# Patient Record
Sex: FEMALE | URBAN - NONMETROPOLITAN AREA
[De-identification: names, ages, dates, MRNs, and addresses within clinical notes are randomized per-mention and may not be internally consistent; named-entity substitution may affect disease eponyms.]

---

## 2021-03-09 ENCOUNTER — OFFICE VISIT (OUTPATIENT)
Dept: URBAN - NONMETROPOLITAN AREA CLINIC 18 | Facility: CLINIC | Age: 56
End: 2021-03-09
Payer: COMMERCIAL

## 2021-03-09 DIAGNOSIS — E11.9 TYPE 2 DIABETES MELLITUS W/O COMPLICATION: Chronic | ICD-10-CM

## 2021-03-09 DIAGNOSIS — H52.13 MYOPIA, BILATERAL: Primary | Chronic | ICD-10-CM

## 2021-03-09 DIAGNOSIS — E05.01: ICD-10-CM

## 2021-03-09 DIAGNOSIS — H25.13 AGE-RELATED NUCLEAR CATARACT, BILATERAL: Chronic | ICD-10-CM

## 2021-03-09 DIAGNOSIS — H40.053 OCULAR HYPERTENSION, BILATERAL: Chronic | ICD-10-CM

## 2021-03-09 PROCEDURE — 92004 COMPRE OPH EXAM NEW PT 1/>: CPT | Performed by: OPTOMETRIST

## 2021-03-09 ASSESSMENT — VISUAL ACUITY
OS: 20/20
OD: 20/25

## 2021-03-09 ASSESSMENT — INTRAOCULAR PRESSURE
OS: 14
OD: 14
OD: 12

## 2021-03-09 NOTE — IMPRESSION/PLAN
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 Plan: Graves diseae. See work up above for Jamir Dorman. Will monitor optic nerve status. Prior thryoid treatment. Consider referral back to Dr. Aubree Jacques if any new symptoms experienced. Cornea scar reviewed based on condition. Longstanding condition per patient.

## 2021-03-09 NOTE — IMPRESSION/PLAN
Impression: Ocular hypertension, bilateral: H40.053. Plan: Discussed status of examination with patient. Continue Combigan QD OU as previously instructed. Recommend glaucoma workup with pachymetry, gonioscopy, VF 24-2, and ON OCT Pretreatment IOP 28, per patient. Patient understands risk associated with condition and need for monitoring.

## 2021-03-09 NOTE — IMPRESSION/PLAN
Impression: Type 2 diabetes mellitus w/o complication: R24.9. Plan: Reviewed with patient that no retinal vascular changes are occurring from diabetes. Patient to continue care with current medication provider for diabetes management. Importance of retinal exams reviewed. Will continue to observe with dilated examination in 12 months.

## 2021-04-16 ENCOUNTER — TESTING ONLY (OUTPATIENT)
Dept: URBAN - NONMETROPOLITAN AREA CLINIC 18 | Facility: CLINIC | Age: 56
End: 2021-04-16
Payer: COMMERCIAL

## 2021-04-16 PROCEDURE — 92083 EXTENDED VISUAL FIELD XM: CPT | Performed by: OPTOMETRIST

## 2021-04-21 ENCOUNTER — OFFICE VISIT (OUTPATIENT)
Dept: URBAN - NONMETROPOLITAN AREA CLINIC 18 | Facility: CLINIC | Age: 56
End: 2021-04-21
Payer: COMMERCIAL

## 2021-04-21 DIAGNOSIS — H04.123 DRY EYE SYNDROME OF BILATERAL LACRIMAL GLANDS: ICD-10-CM

## 2021-04-21 PROCEDURE — 92083 EXTENDED VISUAL FIELD XM: CPT | Performed by: OPTOMETRIST

## 2021-04-21 PROCEDURE — 99214 OFFICE O/P EST MOD 30 MIN: CPT | Performed by: OPTOMETRIST

## 2021-04-21 PROCEDURE — 92133 CPTRZD OPH DX IMG PST SGM ON: CPT | Performed by: OPTOMETRIST

## 2021-04-21 PROCEDURE — 76514 ECHO EXAM OF EYE THICKNESS: CPT | Performed by: OPTOMETRIST

## 2021-04-21 ASSESSMENT — INTRAOCULAR PRESSURE
OS: 16
OD: 14

## 2021-04-21 NOTE — IMPRESSION/PLAN
Impression: Dry eye syndrome of bilateral lacrimal glands: H04.123. Plan: Genteal QHS and PF ATs during the day. Sleeping mask discussed to help reduce symptoms.

## 2021-04-21 NOTE — IMPRESSION/PLAN
Impression: Ocular hypertension, bilateral: H40.053. Plan: Discussed status of examination with patient. Continue Combigan QD OU as previously instructed. Pretreatment IOP 28, per patient. OCT normal RNFL Average. Pachy thin (488/496), VF OD: nasal loss, OS paracentral loss. repeat needed. Patient understands risk associated with condition and need for monitoring.

## 2021-04-21 NOTE — IMPRESSION/PLAN
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 Plan: Continue care with PCP. Dry Eye status discussed. VF with nasal loss OD, mild paracentral loss OS. Repeat needed. ON no signs of compression with observation and OCT ON. Referral to Dr. Luis Atkins offered, patient will notify clinic if desired.

## 2021-07-22 ENCOUNTER — TESTING ONLY (OUTPATIENT)
Dept: URBAN - NONMETROPOLITAN AREA CLINIC 18 | Facility: CLINIC | Age: 56
End: 2021-07-22
Payer: COMMERCIAL

## 2021-07-22 PROCEDURE — 92083 EXTENDED VISUAL FIELD XM: CPT | Performed by: OPTOMETRIST

## 2021-07-28 ENCOUNTER — OFFICE VISIT (OUTPATIENT)
Dept: URBAN - NONMETROPOLITAN AREA CLINIC 18 | Facility: CLINIC | Age: 56
End: 2021-07-28
Payer: COMMERCIAL

## 2021-07-28 PROCEDURE — 99213 OFFICE O/P EST LOW 20 MIN: CPT | Performed by: OPTOMETRIST

## 2021-07-28 ASSESSMENT — INTRAOCULAR PRESSURE
OS: 16
OD: 15

## 2021-07-28 NOTE — IMPRESSION/PLAN
Impression: Dry eye syndrome of bilateral lacrimal glands: H04.123. Plan: Discussed condition with patient in detail. Recommend treatment with Preservative Free Artificial tears QID. RTC if symptoms continue. Genteal gel QHS.

## 2021-07-28 NOTE — IMPRESSION/PLAN
Impression: Ocular hypertension, bilateral: H40.053. Plan: Discussed status of examination with patient. Continue Combigan QD OU as previously instructed. Pretreatment IOP 28, per patient. OCT normal RNFL Average. Pachy thin (488/496), VF OD/OS: no pattern loss. Patient understands risk associated with condition and need for monitoring. IOP check in 4 months.

## 2021-11-24 ENCOUNTER — OFFICE VISIT (OUTPATIENT)
Dept: URBAN - NONMETROPOLITAN AREA CLINIC 18 | Facility: CLINIC | Age: 56
End: 2021-11-24
Payer: COMMERCIAL

## 2021-11-24 PROCEDURE — 99213 OFFICE O/P EST LOW 20 MIN: CPT | Performed by: OPTOMETRIST

## 2021-11-24 ASSESSMENT — INTRAOCULAR PRESSURE
OS: 14
OD: 14
OD: 12
OS: 12

## 2021-11-24 NOTE — IMPRESSION/PLAN
Impression: Ocular hypertension, bilateral: H40.053. Plan: Discussed status of examination with patient. Continue Combigan BID OU. Pretreatment IOP 28, per patient. Target 14. OCT normal RNFL Average. Pachy thin (488/496), VF OD/OS: no pattern loss. Patient understands risk associated with condition and need for monitoring. Dilation (DM exam) and OCT ON in 4 months.

## 2021-11-24 NOTE — IMPRESSION/PLAN
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 Plan: Continue care with PCP. Dry Eye status discussed. Repeat needed. ON no signs of compression with observation and OCT ON.

## 2022-03-30 ENCOUNTER — OFFICE VISIT (OUTPATIENT)
Dept: URBAN - NONMETROPOLITAN AREA CLINIC 18 | Facility: CLINIC | Age: 57
End: 2022-03-30
Payer: COMMERCIAL

## 2022-03-30 DIAGNOSIS — E11.3393 TYPE 2 DIAB W MODERATE NONPRLF DIAB RTNOP W/O MACULAR EDEMA, BILATERAL: Primary | ICD-10-CM

## 2022-03-30 DIAGNOSIS — H17.89 OTHER CORNEAL SCARS AND OPACITIES: ICD-10-CM

## 2022-03-30 PROCEDURE — 92133 CPTRZD OPH DX IMG PST SGM ON: CPT | Performed by: OPTOMETRIST

## 2022-03-30 PROCEDURE — 99214 OFFICE O/P EST MOD 30 MIN: CPT | Performed by: OPTOMETRIST

## 2022-03-30 PROCEDURE — 92250 FUNDUS PHOTOGRAPHY W/I&R: CPT | Performed by: OPTOMETRIST

## 2022-03-30 ASSESSMENT — INTRAOCULAR PRESSURE
OS: 13
OD: 13

## 2022-03-30 NOTE — IMPRESSION/PLAN
Impression: Ocular hypertension, bilateral: H40.053. Plan: Discussed status of examination with patient. Continue Combigan BID OU. Pretreatment IOP 28, per patient. Target 14. OCT ON today, stable RNFL Average. Pachy thin (488/496), VF OD/OS: no pattern loss. Patient understands risk associated with condition and need for monitoring.

## 2022-03-30 NOTE — IMPRESSION/PLAN
Impression: Type 2 diab w moderate nonprlf diab rtnop w/o macular edema, bilateral: S70.0120. Plan: Reviewed with patient that mild retinal vascular changes are occurring from diabetes. Baseline fundus photos today. These changes do not require treatment at this time, however, the patient is aware that the condition can progress and may require additional treatment in the future. Systemic monitoring with PCP/endocrinologist is recommended to decrease risk of damage from diabetes. Will continue to observe with dilated examination in 8 months.

## 2022-03-30 NOTE — IMPRESSION/PLAN
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 Plan: Continue care with PCP. Dry Eye status discussed. No signs of compression with observation and OCT ON.

## 2022-12-23 ENCOUNTER — TESTING ONLY (OUTPATIENT)
Dept: URBAN - NONMETROPOLITAN AREA CLINIC 18 | Facility: CLINIC | Age: 57
End: 2022-12-23
Payer: COMMERCIAL

## 2022-12-23 DIAGNOSIS — H40.053 OCULAR HYPERTENSION, BILATERAL: Primary | ICD-10-CM

## 2022-12-23 DIAGNOSIS — E11.3393 TYPE 2 DIABETES MELLITUS WITH MODERATE NONPROLIFERATIVE DIABETIC RETINOPATHY WITHOUT MACULAR EDEMA, BILATERAL: ICD-10-CM

## 2022-12-23 PROCEDURE — 92134 CPTRZ OPH DX IMG PST SGM RTA: CPT | Performed by: OPTOMETRIST

## 2022-12-23 PROCEDURE — 92083 EXTENDED VISUAL FIELD XM: CPT | Performed by: OPTOMETRIST

## 2023-01-03 ENCOUNTER — OFFICE VISIT (OUTPATIENT)
Dept: URBAN - NONMETROPOLITAN AREA CLINIC 18 | Facility: CLINIC | Age: 58
End: 2023-01-03
Payer: COMMERCIAL

## 2023-01-03 DIAGNOSIS — E11.3393 TYPE 2 DIAB W MODERATE NONPRLF DIAB RTNOP W/O MACULAR EDEMA, BILATERAL: ICD-10-CM

## 2023-01-03 DIAGNOSIS — H40.053 OCULAR HYPERTENSION, BILATERAL: Primary | ICD-10-CM

## 2023-01-03 DIAGNOSIS — E05.01: ICD-10-CM

## 2023-01-03 DIAGNOSIS — H52.13 MYOPIA, BILATERAL: ICD-10-CM

## 2023-01-03 PROCEDURE — 99213 OFFICE O/P EST LOW 20 MIN: CPT | Performed by: OPTOMETRIST

## 2023-01-03 ASSESSMENT — INTRAOCULAR PRESSURE
OS: 15
OD: 13

## 2023-01-03 ASSESSMENT — VISUAL ACUITY
OS: 20/20
OD: 20/20

## 2023-01-03 NOTE — IMPRESSION/PLAN
Impression: Type 2 diab w moderate nonprlf diab rtnop w/o macular edema, bilateral: W43.5612. Plan: Dilation deferred today. Macula OCT flat, no edema. Monitor with dilation in 2-3 months. Limited retina views today. RTC if any vision changes experienced.

## 2023-01-03 NOTE — IMPRESSION/PLAN
Impression: Ocular hypertension, bilateral: H40.053. Plan: Discussed status of examination with patient. Continue Combigan BID OU, tolerating well. Pretreatment IOP 28, per patient. Target 14.  (borderline) OCT ON today, stable RNFL Average. Pachy thin (488/496), VF OD/OS: no pattern loss (stable) Patient understands risk associated with condition and need for monitoring.

## 2023-04-11 ENCOUNTER — OFFICE VISIT (OUTPATIENT)
Dept: URBAN - NONMETROPOLITAN AREA CLINIC 18 | Facility: CLINIC | Age: 58
End: 2023-04-11
Payer: COMMERCIAL

## 2023-04-11 DIAGNOSIS — H17.89 OTHER CORNEAL SCARS AND OPACITIES: ICD-10-CM

## 2023-04-11 DIAGNOSIS — E11.3393 TYPE 2 DIAB W MODERATE NONPRLF DIAB RTNOP W/O MACULAR EDEMA, BILATERAL: Primary | ICD-10-CM

## 2023-04-11 DIAGNOSIS — H04.123 DRY EYE SYNDROME OF BILATERAL LACRIMAL GLANDS: ICD-10-CM

## 2023-04-11 DIAGNOSIS — H40.053 OCULAR HYPERTENSION, BILATERAL: ICD-10-CM

## 2023-04-11 DIAGNOSIS — E05.01: ICD-10-CM

## 2023-04-11 DIAGNOSIS — H25.13 AGE-RELATED NUCLEAR CATARACT, BILATERAL: ICD-10-CM

## 2023-04-11 PROCEDURE — 99213 OFFICE O/P EST LOW 20 MIN: CPT | Performed by: OPTOMETRIST

## 2023-04-11 ASSESSMENT — INTRAOCULAR PRESSURE
OS: 14
OD: 14

## 2023-04-11 NOTE — IMPRESSION/PLAN
Impression: Type 2 diab w moderate nonprlf diab rtnop w/o macular edema, bilateral: W90.5211. Plan: Reviewed with patient that mild retinal vascular changes are occurring from diabetes. Baseline fundus photos today. These changes do not require treatment at this time, however, the patient is aware that the condition can progress and may require additional treatment in the future. Systemic monitoring with PCP/endocrinologist is recommended to decrease risk of damage from diabetes. Will continue to observe with dilated examination in 9 months.

## 2023-04-11 NOTE — IMPRESSION/PLAN
Impression: Ocular hypertension, bilateral: H40.053. Plan: Discussed status of examination with patient. Continue Combigan BID OU, tolerating well. Pretreatment IOP 28, per patient. Target 14, met today. OCT ON , stable RNFL Average. Pachy thin (488/496) VF OD/OS: no pattern loss (stable) Patient understands risk associated with condition and need for monitoring.

## 2023-04-11 NOTE — IMPRESSION/PLAN
Impression: Dry eye syndrome of bilateral lacrimal glands: H04.123. Plan: Discussed condition with patient in detail. Continue treatment with Preservative Free Artificial tears QID. RTC if symptoms continue.

## 2023-08-15 ENCOUNTER — OFFICE VISIT (OUTPATIENT)
Dept: URBAN - NONMETROPOLITAN AREA CLINIC 18 | Facility: CLINIC | Age: 58
End: 2023-08-15
Payer: COMMERCIAL

## 2023-08-15 DIAGNOSIS — H17.89 OTHER CORNEAL SCARS AND OPACITIES: ICD-10-CM

## 2023-08-15 DIAGNOSIS — H40.053 OCULAR HYPERTENSION, BILATERAL: Primary | ICD-10-CM

## 2023-08-15 DIAGNOSIS — E05.01: ICD-10-CM

## 2023-08-15 DIAGNOSIS — H25.13 AGE-RELATED NUCLEAR CATARACT, BILATERAL: ICD-10-CM

## 2023-08-15 DIAGNOSIS — H04.123 DRY EYE SYNDROME OF BILATERAL LACRIMAL GLANDS: ICD-10-CM

## 2023-08-15 PROCEDURE — 99213 OFFICE O/P EST LOW 20 MIN: CPT | Performed by: OPTOMETRIST

## 2023-08-15 PROCEDURE — 92133 CPTRZD OPH DX IMG PST SGM ON: CPT | Performed by: OPTOMETRIST

## 2023-08-15 ASSESSMENT — INTRAOCULAR PRESSURE
OS: 10
OD: 12